# Patient Record
Sex: MALE | Race: WHITE | Employment: OTHER | URBAN - METROPOLITAN AREA
[De-identification: names, ages, dates, MRNs, and addresses within clinical notes are randomized per-mention and may not be internally consistent; named-entity substitution may affect disease eponyms.]

---

## 2019-03-07 ENCOUNTER — OFFICE VISIT (OUTPATIENT)
Dept: URGENT CARE | Facility: URGENT CARE | Age: 29
End: 2019-03-07
Payer: COMMERCIAL

## 2019-03-07 VITALS
HEART RATE: 65 BPM | OXYGEN SATURATION: 98 % | TEMPERATURE: 97 F | DIASTOLIC BLOOD PRESSURE: 96 MMHG | SYSTOLIC BLOOD PRESSURE: 134 MMHG

## 2019-03-07 DIAGNOSIS — S09.93XA FACIAL INJURY, INITIAL ENCOUNTER: Primary | ICD-10-CM
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
## 2019-03-07 PROCEDURE — 99203 OFFICE O/P NEW LOW 30 MIN: CPT | Performed by: PHYSICIAN ASSISTANT

## 2019-03-07 RX ORDER — ESZOPICLONE 2 MG/1
TABLET, FILM COATED ORAL
Refills: 0 | COMMUNITY
Start: 2019-03-01

## 2019-03-07 NOTE — PROGRESS NOTES
[id number] Trev Gonzalez  Working as a subcontractor getting rid of ice dams.  Unsure who's insurance he is working under    SUBJECTIVE:  Chief Complaint   Patient presents with     Urgent Care     Fall     fell off roof and hit face on x 30 minutes     Delfina Almaraz is a 28 year old male presents with a chief complaint of fall from 20 feet with coworker Maximiliano. Both patient and his coworker are unsure if he lost consciousness. Head and face hurt.  Some pain moving jaw side to side. Currently no confusion, shortness of breath, difficulty swallowing, difficulty breathing.  No Neurologic deficit, though vision impaired due to swelling.  No pain in neck.    PAtient fell from roof, struck a deck table, rolled and made contact with a railing post.      No past medical history on file.  Current Outpatient Medications   Medication Sig Dispense Refill     SERTRALINE HCL PO Take by mouth daily       eszopiclone (LUNESTA) 2 MG tablet   0     Social History     Tobacco Use     Smoking status: Not on file   Substance Use Topics     Alcohol use: Not on file       ROS:  10 point ROS negative except as listed above      EXAM:   BP (!) 134/96 (BP Location: Right arm, Patient Position: Chair, Cuff Size: Adult Regular)   Pulse 65   Temp 97  F (36.1  C) (Tympanic)   SpO2 98%   Gen: moderate distress  Extremity: swelling of nasal bridge, right maxilla.  Blood present, though no active bleeding.  No byrd signs.    CHEST: clear to auscultation  CV: regular rate and rhythm  NEURO:  Mentation intact and speech normal    X-RAY Deffered    ASSESSMENT:   (S09.93XA) Facial injury, initial encounter  (primary encounter diagnosis)  Comment: vitals and airway intact.  denies neuro deficit, neck pain, difficulty breathing  Plan: SENT TO ER BY AMBULANCE'      (W19.XXXA) Fall, initial encounter  Comment: Vitals and airway intact.  denies neuro deficit, neck pain, difficulty breathingsignificant JASMINE, falling from 2 stories  Plan: SENT TO ER  BY AMBULANCE

## 2019-03-11 ENCOUNTER — TELEPHONE (OUTPATIENT)
Dept: INTERNAL MEDICINE | Facility: CLINIC | Age: 29
End: 2019-03-11
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
## 2019-03-12 NOTE — TELEPHONE ENCOUNTER
Phone Call:     Contact Name [facility]   Outcome Called film room to push CT scans and Xray to [facility]. Also sent a fax to Medical records for reports and ED notes

## 2019-03-12 NOTE — TELEPHONE ENCOUNTER
[facility] Call Center    Phone Message    May a detailed message be left on voicemail: yes    Reason for Call: Other: discharge paperwork from [facility] emergency room had two CT scans and xray of all broken bones      Action Taken: Message routed to:  [facility] ([facility]): NOE ENT

## 2019-03-13 ENCOUNTER — DOCUMENTATION ONLY (OUTPATIENT)
Dept: OTOLARYNGOLOGY | Facility: CLINIC | Age: 29
End: 2019-03-13

## 2019-03-13 NOTE — PROGRESS NOTES
Called mother of PT chepe back this afternoon after she called our surgery scheduler Marisa. Mother wanted us to schedule surgery for pt. I informed mother that since she is not listed on his chart that we are not able to discuss care of pt. She informed me that the pt had gone to [facility] and sent him out without pain meds or follow up and so she was following up with us to see if we would do surgery. Told pt that our team had communicated with [facility] and that the pt was to follow up there and was to be seen by them. PT mother was upset stating that she had worked for [facility] and that it had terrible care and that PT would not be going back to them to be seen. I told mother that she would have to talk with [facility] and PT and be put on our chart for the pt for us to be able to discuss with her anything more about the patient. PT  Was angry and wanted to know why our team would not see the pt. I told her that with follow up already set with [facility] that we would not interfere with that care. PT mother was angry and said she would get herself put on the chart and would call [facility] to follow up.

## 2019-03-18 NOTE — TELEPHONE ENCOUNTER
Mom called back in and demanded that patient be seen by Dr Lori Swanson.  She wants a call back only from Mariana to disucss scheduling.

## 2019-03-19 NOTE — TELEPHONE ENCOUNTER
Call placed to patient's mother and a generic message was left informing her the clinic supervisor was calling to reiterate she is not listed as a contact in our system and we could not discuss any care with her at this point. If a return call is placed to the [facility], please reiterate we are unable to discuss any care with her.      Con Logan RN